# Patient Record
Sex: FEMALE | Race: BLACK OR AFRICAN AMERICAN | NOT HISPANIC OR LATINO | Employment: UNEMPLOYED | URBAN - METROPOLITAN AREA
[De-identification: names, ages, dates, MRNs, and addresses within clinical notes are randomized per-mention and may not be internally consistent; named-entity substitution may affect disease eponyms.]

---
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
## 2019-06-04 VITALS
WEIGHT: 112 LBS | BODY MASS INDEX: 17.58 KG/M2 | BODY MASS INDEX: 18.79 KG/M2 | WEIGHT: 120 LBS | WEIGHT: 120 LBS | HEIGHT: 67 IN | DIASTOLIC BLOOD PRESSURE: 74 MMHG | SYSTOLIC BLOOD PRESSURE: 111 MMHG | HEART RATE: 61 BPM | RESPIRATION RATE: 20 BRPM | DIASTOLIC BLOOD PRESSURE: 59 MMHG | BODY MASS INDEX: 18.83 KG/M2 | HEIGHT: 67 IN | HEART RATE: 81 BPM | DIASTOLIC BLOOD PRESSURE: 67 MMHG | SYSTOLIC BLOOD PRESSURE: 96 MMHG | HEART RATE: 62 BPM | SYSTOLIC BLOOD PRESSURE: 116 MMHG

## 2019-10-14 ENCOUNTER — HOSPITAL ENCOUNTER (EMERGENCY)
Facility: HOSPITAL | Age: 19
Discharge: LEFT BEFORE DISCHARGE | End: 2019-10-15

## 2019-10-14 VITALS
HEART RATE: 69 BPM | RESPIRATION RATE: 14 BRPM | TEMPERATURE: 97.7 F | OXYGEN SATURATION: 100 % | HEIGHT: 67 IN | WEIGHT: 125.88 LBS | DIASTOLIC BLOOD PRESSURE: 78 MMHG | SYSTOLIC BLOOD PRESSURE: 132 MMHG | BODY MASS INDEX: 19.76 KG/M2

## 2019-10-17 ENCOUNTER — HOSPITAL ENCOUNTER (EMERGENCY)
Facility: HOSPITAL | Age: 19
Discharge: HOME | End: 2019-10-17
Attending: EMERGENCY MEDICINE | Admitting: EMERGENCY MEDICINE

## 2019-10-17 VITALS
WEIGHT: 120.37 LBS | HEART RATE: 67 BPM | DIASTOLIC BLOOD PRESSURE: 71 MMHG | TEMPERATURE: 98.6 F | RESPIRATION RATE: 18 BRPM | BODY MASS INDEX: 18.89 KG/M2 | HEIGHT: 67 IN | OXYGEN SATURATION: 99 % | SYSTOLIC BLOOD PRESSURE: 115 MMHG

## 2019-10-17 DIAGNOSIS — R10.9 ABDOMINAL CRAMPING: ICD-10-CM

## 2019-10-17 DIAGNOSIS — Z34.90 PREGNANCY, UNSPECIFIED GESTATIONAL AGE: Primary | ICD-10-CM

## 2019-10-17 DIAGNOSIS — R11.0 NAUSEA: ICD-10-CM

## 2019-10-17 LAB
BACTERIA UR QL AUTO: ABNORMAL /HPF
BASOPHILS # BLD AUTO: 0 10*3/MM3 (ref 0–0.2)
BASOPHILS NFR BLD AUTO: 0.3 % (ref 0–1.5)
BILIRUB UR QL STRIP: NEGATIVE
C TRACH RRNA CVX QL NAA+PROBE: DETECTED
CLARITY UR: ABNORMAL
CLUE CELLS SPEC QL WET PREP: ABNORMAL
COLOR UR: YELLOW
DEPRECATED RDW RBC AUTO: 47.3 FL (ref 37–54)
EOSINOPHIL # BLD AUTO: 0 10*3/MM3 (ref 0–0.4)
EOSINOPHIL NFR BLD AUTO: 0.1 % (ref 0.3–6.2)
ERYTHROCYTE [DISTWIDTH] IN BLOOD BY AUTOMATED COUNT: 14.6 % (ref 12.3–15.4)
GLUCOSE UR STRIP-MCNC: NEGATIVE MG/DL
HCG INTACT+B SERPL-ACNC: NORMAL MIU/ML
HCT VFR BLD AUTO: 40.7 % (ref 34–46.6)
HGB BLD-MCNC: 13.8 G/DL (ref 12–15.9)
HGB UR QL STRIP.AUTO: NEGATIVE
HYALINE CASTS UR QL AUTO: ABNORMAL /LPF
HYDATID CYST SPEC WET PREP: ABNORMAL
KETONES UR QL STRIP: ABNORMAL
LEUKOCYTE ESTERASE UR QL STRIP.AUTO: ABNORMAL
LYMPHOCYTES # BLD AUTO: 1.8 10*3/MM3 (ref 0.7–3.1)
LYMPHOCYTES NFR BLD AUTO: 16.8 % (ref 19.6–45.3)
MCH RBC QN AUTO: 31.6 PG (ref 26.6–33)
MCHC RBC AUTO-ENTMCNC: 33.9 G/DL (ref 31.5–35.7)
MCV RBC AUTO: 93.3 FL (ref 79–97)
MONOCYTES # BLD AUTO: 0.6 10*3/MM3 (ref 0.1–0.9)
MONOCYTES NFR BLD AUTO: 5.2 % (ref 5–12)
N GONORRHOEA RRNA SPEC QL NAA+PROBE: NOT DETECTED
NEUTROPHILS # BLD AUTO: 8.4 10*3/MM3 (ref 1.7–7)
NEUTROPHILS NFR BLD AUTO: 77.6 % (ref 42.7–76)
NITRITE UR QL STRIP: NEGATIVE
NRBC BLD AUTO-RTO: 0 /100 WBC (ref 0–0.2)
PH UR STRIP.AUTO: 6.5 [PH] (ref 5–8)
PLATELET # BLD AUTO: 206 10*3/MM3 (ref 140–450)
PMV BLD AUTO: 7.6 FL (ref 6–12)
PROT UR QL STRIP: NEGATIVE
RBC # BLD AUTO: 4.36 10*6/MM3 (ref 3.77–5.28)
RBC # UR: ABNORMAL /HPF
REF LAB TEST METHOD: ABNORMAL
SP GR UR STRIP: 1.02 (ref 1–1.03)
SQUAMOUS #/AREA URNS HPF: ABNORMAL /HPF
T VAGINALIS SPEC QL WET PREP: ABNORMAL
UROBILINOGEN UR QL STRIP: ABNORMAL
WBC NRBC COR # BLD: 10.9 10*3/MM3 (ref 3.4–10.8)
WBC SPEC QL WET PREP: ABNORMAL
WBC UR QL AUTO: ABNORMAL /HPF
YEAST GENITAL QL WET PREP: ABNORMAL

## 2019-10-17 PROCEDURE — 36415 COLL VENOUS BLD VENIPUNCTURE: CPT

## 2019-10-17 PROCEDURE — 87591 N.GONORRHOEAE DNA AMP PROB: CPT | Performed by: EMERGENCY MEDICINE

## 2019-10-17 PROCEDURE — 87210 SMEAR WET MOUNT SALINE/INK: CPT | Performed by: EMERGENCY MEDICINE

## 2019-10-17 PROCEDURE — 81001 URINALYSIS AUTO W/SCOPE: CPT | Performed by: EMERGENCY MEDICINE

## 2019-10-17 PROCEDURE — 99283 EMERGENCY DEPT VISIT LOW MDM: CPT

## 2019-10-17 PROCEDURE — 85025 COMPLETE CBC W/AUTO DIFF WBC: CPT | Performed by: EMERGENCY MEDICINE

## 2019-10-17 PROCEDURE — 84702 CHORIONIC GONADOTROPIN TEST: CPT | Performed by: EMERGENCY MEDICINE

## 2019-10-17 PROCEDURE — 87491 CHLMYD TRACH DNA AMP PROBE: CPT | Performed by: EMERGENCY MEDICINE

## 2019-10-17 RX ORDER — PNV NO.95/FERROUS FUM/FOLIC AC 28MG-0.8MG
1 TABLET ORAL DAILY
Qty: 30 TABLET | Refills: 0 | Status: SHIPPED | OUTPATIENT
Start: 2019-10-17

## 2019-10-17 NOTE — ED PROVIDER NOTES
Subjective   History of Present Illness  19-year-old female presents with abdominal cramping.  She states this is been going on for about 3 weeks since she found out she was pregnant.  She had some nausea no vomiting.  She reports no vaginal discharge or bleeding.  She reports no dysuria.  This is her first pregnancy  Review of Systems  Negative for fever cough shortness of breath dysuria  History reviewed. No pertinent past medical history.    No Known Allergies    History reviewed. No pertinent surgical history.    History reviewed. No pertinent family history.    Social History     Socioeconomic History   • Marital status: Single     Spouse name: Not on file   • Number of children: Not on file   • Years of education: Not on file   • Highest education level: Not on file   Tobacco Use   • Smoking status: Current Every Day Smoker     Packs/day: 0.50   Substance and Sexual Activity   • Alcohol use: No     Frequency: Never   • Drug use: Yes     Frequency: 4.0 times per week     Types: Marijuana     Routine medications      Objective   Physical Exam  19-year-old female awake alert.  Generally well developed well-nourished.  Pupils equal react light.  Oropharynx mucous memories moist neck supple chest clear equal breath sounds cardiovascular regular rhythm abdomen soft without localizing tenderness mass rebound or guarding no CVA tenderness.  Pelvic exam was performed white discharge in vault noted.  Cervix is closed.  There is no cervical motion tenderness no adnexal mass or tenderness.  Uterus feels 6 to 8 weeks size  Procedures           ED Course      Results for orders placed or performed during the hospital encounter of 10/17/19   Wet Prep, Genital - Swab, Vagina   Result Value Ref Range    YEAST No yeast seen No yeast seen    HYPHAL ELEMENTS No Hyphal elements seen No Hyphal elements seen    WBC'S 2+ WBC's seen (A) No WBC's seen    Clue Cells, Wet Prep No Clue cells seen No Clue cells seen    Trichomonas, Wet  Prep No Trichomonas seen No Trichomonas seen   Urinalysis With Microscopic If Indicated (No Culture) -   Result Value Ref Range    Color, UA Yellow Yellow, Straw    Appearance, UA Cloudy (A) Clear    pH, UA 6.5 5.0 - 8.0    Specific Gravity, UA 1.025 1.005 - 1.030    Glucose, UA Negative Negative    Ketones, UA 15 mg/dL (1+) (A) Negative    Bilirubin, UA Negative Negative    Blood, UA Negative Negative    Protein, UA Negative Negative    Leuk Esterase, UA Small (1+) (A) Negative    Nitrite, UA Negative Negative    Urobilinogen, UA 1.0 E.U./dL 0.2 - 1.0 E.U./dL   hCG, Quantitative, Pregnancy   Result Value Ref Range    HCG Quantitative 80,451.00 mIU/mL   CBC Auto Differential   Result Value Ref Range    WBC 10.90 (H) 3.40 - 10.80 10*3/mm3    RBC 4.36 3.77 - 5.28 10*6/mm3    Hemoglobin 13.8 12.0 - 15.9 g/dL    Hematocrit 40.7 34.0 - 46.6 %    MCV 93.3 79.0 - 97.0 fL    MCH 31.6 26.6 - 33.0 pg    MCHC 33.9 31.5 - 35.7 g/dL    RDW 14.6 12.3 - 15.4 %    RDW-SD 47.3 37.0 - 54.0 fl    MPV 7.6 6.0 - 12.0 fL    Platelets 206 140 - 450 10*3/mm3    Neutrophil % 77.6 (H) 42.7 - 76.0 %    Lymphocyte % 16.8 (L) 19.6 - 45.3 %    Monocyte % 5.2 5.0 - 12.0 %    Eosinophil % 0.1 (L) 0.3 - 6.2 %    Basophil % 0.3 0.0 - 1.5 %    Neutrophils, Absolute 8.40 (H) 1.70 - 7.00 10*3/mm3    Lymphocytes, Absolute 1.80 0.70 - 3.10 10*3/mm3    Monocytes, Absolute 0.60 0.10 - 0.90 10*3/mm3    Eosinophils, Absolute 0.00 0.00 - 0.40 10*3/mm3    Basophils, Absolute 0.00 0.00 - 0.20 10*3/mm3    nRBC 0.0 0.0 - 0.2 /100 WBC   Urinalysis, Microscopic Only - Urine, Clean Catch   Result Value Ref Range    RBC, UA None Seen None Seen /HPF    WBC, UA 6-12 (A) None Seen /HPF    Bacteria, UA Trace (A) None Seen /HPF    Squamous Epithelial Cells, UA 3-6 (A) None Seen, 0-2 /HPF    Hyaline Casts, UA 0-2 None Seen /LPF    Methodology Manual Light Microscopy      No radiology results for the last day  Medications - No data to display  /78 (BP Location: Left  "arm, Patient Position: Lying)   Pulse 66   Temp 97.5 °F (36.4 °C) (Oral)   Resp 16   Ht 170.2 cm (67\")   Wt 54.6 kg (120 lb 5.9 oz)   LMP 09/01/2019   SpO2 99%   BMI 18.85 kg/m²               MDM  Chart review:   Comorbidity: Pregnant  Differential: Pregnancy, IUP, ectopic, UTI, vaginitis  My EKG interpretation:   Lab: Wet prep 2+ white cells no yeast no clue cells no trichomonas, urinalysis 6-12 white cells trace bacteria on voided specimen.  CBC white count 10.9 with hemoglobin 13.8 platelet count 206 with 77 segs no bands serum hCG 80,451  Radiology:   Discussion/treatment: Patient's findings were discussed with her.  She has a completely benign abdominal exam no findings that would suggest ectopic pregnancy with hCG of 80,000.  She has follow-up appointment with OB for next week.  She was discharged.  Advised she could use Tylenol for pain.  She was started on prenatal vitamins.  She is to return new or worsening symptoms increased pain or bleeding.    Final diagnoses:   Pregnancy, unspecified gestational age   Abdominal cramping   Nausea              Jaime Otero MD  10/17/19 2052    "

## 2019-10-17 NOTE — ED NOTES
Pt c/o lower abdominal pain for 2 weeks, cramping in nature. Pt approx 6 weeks pregnant. Denies vaginal bleeding, urinary symptoms, N/V/D, or fever. Pt is Ab 0.     Denise Park RN  10/17/19 0834

## 2019-10-18 NOTE — PROGRESS NOTES
Spoke to the patient and called in azithromycin 1 gm PO to [facility]. Educated the patient on importance of notifying her sexual partners to get tested and treated. Patient agreed to proceed with the treatment plan.     Contains abnormal data Chlamydia trachomatis, Neisseria gonorrhoeae, PCR - Swab, Cervix   Order: [id number]   Status:  Final result   Visible to patient:  No (Not Released) Next appt:  None   Specimen Information: Cervix; Swab        Component  Ref Range & Units    Chlamydia DNA by PCR  Not Detected Detected Abnormal     Neisseria gonorrhoeae by PCR  Not Detected Not Detected    Resulting Agency [facility]         Specimen Collected: 10/17/19 19:18 Last Resulted: 10/17/19 21:10 Order Details View Encounter Lab and Collection Details Routing Result History             Kylie Buckley, RubenD, BCPS  10/18/19 2:54 PM

## 2020-01-30 ENCOUNTER — HOSPITAL ENCOUNTER (EMERGENCY)
Facility: HOSPITAL | Age: 20
Discharge: HOME | End: 2020-01-30
Admitting: EMERGENCY MEDICINE

## 2020-01-30 VITALS
WEIGHT: 138.23 LBS | RESPIRATION RATE: 16 BRPM | HEIGHT: 67 IN | DIASTOLIC BLOOD PRESSURE: 65 MMHG | HEART RATE: 65 BPM | TEMPERATURE: 98.2 F | SYSTOLIC BLOOD PRESSURE: 99 MMHG | OXYGEN SATURATION: 98 % | BODY MASS INDEX: 21.7 KG/M2

## 2020-01-30 DIAGNOSIS — S90.415A TOE ABRASION, LEFT, INITIAL ENCOUNTER: Primary | ICD-10-CM

## 2020-01-30 PROCEDURE — 90471 IMMUNIZATION ADMIN: CPT | Performed by: PHYSICIAN ASSISTANT

## 2020-01-30 PROCEDURE — 90715 TDAP VACCINE 7 YRS/> IM: CPT | Performed by: PHYSICIAN ASSISTANT

## 2020-01-30 PROCEDURE — 25010000002 TDAP 5-2.5-18.5 LF-MCG/0.5 SUSPENSION: Performed by: PHYSICIAN ASSISTANT

## 2020-01-30 PROCEDURE — 99283 EMERGENCY DEPT VISIT LOW MDM: CPT

## 2020-01-30 RX ORDER — CEPHALEXIN 500 MG/1
500 CAPSULE ORAL 3 TIMES DAILY
Qty: 30 CAPSULE | Refills: 0 | Status: SHIPPED | OUTPATIENT
Start: 2020-01-30 | End: 2022-09-18

## 2020-01-30 RX ADMIN — TETANUS TOXOID, REDUCED DIPHTHERIA TOXOID AND ACELLULAR PERTUSSIS VACCINE, ADSORBED 0.5 ML: 5; 2.5; 8; 8; 2.5 SUSPENSION INTRAMUSCULAR at 19:34

## 2024-05-21 PROCEDURE — 99283 EMERGENCY DEPT VISIT LOW MDM: CPT

## 2024-05-22 ENCOUNTER — APPOINTMENT (OUTPATIENT)
Dept: GENERAL RADIOLOGY | Facility: HOSPITAL | Age: 24
End: 2024-05-22
Payer: MEDICAID

## 2024-05-22 ENCOUNTER — HOSPITAL ENCOUNTER (EMERGENCY)
Facility: HOSPITAL | Age: 24
Discharge: HOME | End: 2024-05-22
Attending: EMERGENCY MEDICINE | Admitting: EMERGENCY MEDICINE
Payer: MEDICAID

## 2024-05-22 VITALS
RESPIRATION RATE: 18 BRPM | OXYGEN SATURATION: 98 % | BODY MASS INDEX: 19.58 KG/M2 | WEIGHT: 124.78 LBS | HEIGHT: 67 IN | DIASTOLIC BLOOD PRESSURE: 86 MMHG | TEMPERATURE: 98.2 F | HEART RATE: 104 BPM | SYSTOLIC BLOOD PRESSURE: 132 MMHG

## 2024-05-22 DIAGNOSIS — R13.10 PAINFUL SWALLOWING: Primary | ICD-10-CM

## 2024-05-22 PROCEDURE — 70360 X-RAY EXAM OF NECK: CPT

## 2024-05-22 PROCEDURE — 71046 X-RAY EXAM CHEST 2 VIEWS: CPT

## 2024-05-22 RX ORDER — ALUMINA, MAGNESIA, AND SIMETHICONE 2400; 2400; 240 MG/30ML; MG/30ML; MG/30ML
15 SUSPENSION ORAL EVERY 6 HOURS PRN
Status: DISCONTINUED | OUTPATIENT
Start: 2024-05-22 | End: 2024-05-22 | Stop reason: HOSPADM

## 2024-05-22 RX ADMIN — ALUMINUM HYDROXIDE, MAGNESIUM HYDROXIDE, AND SIMETHICONE 15 ML: 2400; 240; 2400 SUSPENSION ORAL at 02:01

## 2024-05-22 NOTE — ED PROVIDER NOTES
"Subjective   Chief Complaint   Patient presents with    Swallowed Foreign Body     Pt reports she feels like she has something stuck in her throat.  Pt reports she ate grilled food yesterday, states she feels something on the L side of her throat.  Pt reports it is affecting her breathing.      Provider, No Known      History of Present Illness  Patient is a 24-year-old presents emergency department for evaluation of difficulty swallowing.  Patient for she feels as though something is stuck in her throat.  She Prote[employer].  Yesterday, and father may have been a \"wire\" in her food.  She denies any history of esophageal issues, no history of dilatation.  She reports she is able to drink and eat food.  No fevers.  No respiratory symptoms.  No vomiting.  No abdominal pain.  Review of Systems  Per HPI  No past medical history on file.    No Known Allergies    No past surgical history on file.    No family history on file.    Social History     Socioeconomic History    Marital status: Single   Tobacco Use    Smoking status: Every Day     Current packs/day: 0.50     Types: Cigarettes    Smokeless tobacco: Never   Substance and Sexual Activity    Alcohol use: No    Drug use: Yes     Frequency: 4.0 times per week     Types: Marijuana           Objective   Physical Exam  Vitals and nursing note reviewed.   Constitutional:       Appearance: Normal appearance. She is not ill-appearing or toxic-appearing.   HENT:      Head: Normocephalic and atraumatic.      Nose: Nose normal.      Mouth/Throat:      Lips: Pink.      Mouth: Mucous membranes are moist.      Pharynx: Oropharynx is clear. Uvula midline.   Eyes:      Conjunctiva/sclera: Conjunctivae normal.      Pupils: Pupils are equal, round, and reactive to light.   Neck:      Thyroid: No thyroid mass.      Trachea: Trachea and phonation normal.   Cardiovascular:      Rate and Rhythm: Normal rate and regular rhythm.      Heart sounds: Normal heart sounds. No murmur heard.     No " "friction rub. No gallop.   Pulmonary:      Effort: Pulmonary effort is normal.      Breath sounds: Normal breath sounds. No stridor.   Abdominal:      General: Bowel sounds are normal.      Palpations: Abdomen is soft.      Tenderness: There is no abdominal tenderness. There is no guarding or rebound.   Musculoskeletal:         General: Normal range of motion.      Cervical back: Full passive range of motion without pain, normal range of motion and neck supple.      Right lower leg: No edema.      Left lower leg: No edema.   Lymphadenopathy:      Cervical: No cervical adenopathy.   Skin:     General: Skin is warm and dry.      Capillary Refill: Capillary refill takes less than 2 seconds.      Findings: No rash.   Neurological:      General: No focal deficit present.      Mental Status: She is alert and oriented to person, place, and time.   Psychiatric:         Mood and Affect: Mood normal.         Behavior: Behavior normal.         Procedures           ED Course      /86 (BP Location: Left arm, Patient Position: Sitting)   Pulse 104   Temp 98.2 °F (36.8 °C) (Oral)   Resp 18   Ht 170.2 cm (67\")   Wt 56.6 kg (124 lb 12.5 oz)   LMP 05/17/2024   SpO2 98%   Breastfeeding No   BMI 19.54 kg/m²   Medications - No data to display  XR Neck Soft Tissue    Result Date: 5/22/2024  Impression: No evidence of foreign body on this single lateral image of the neck. Electronically Signed: Jacob Bui MD  5/22/2024 1:55 AM EDT  Workstation ID: [id number]    XR Chest 2 View    Result Date: 5/22/2024  Impression: No active disease Electronically Signed: Jacob Bui MD  5/22/2024 1:54 AM EDT  Workstation ID: [id number]   Lab Results (last 24 hours)       ** No results found for the last 24 hours. **                                                 Medical Decision Making  Problems Addressed:  Painful swallowing: complicated acute illness or injury    Amount and/or Complexity of Data Reviewed  Radiology: ordered.    Risk  OTC " drugs.      Imaging: XR Neck Soft Tissue    Result Date: 5/22/2024  Impression: No evidence of foreign body on this single lateral image of the neck. Electronically Signed: Jacob Bui MD  5/22/2024 1:55 AM EDT  Workstation ID: [id number]    XR Chest 2 View    Result Date: 5/22/2024  Impression: No active disease Electronically Signed: Jacob Bui MD  5/22/2024 1:54 AM EDT  Workstation ID: [id number]     Pt was Placed on appropriate monitoring.  Differential diagnoses considered for patient presentation, this list is not all inclusive of diagnoses considered: Food bolus, foreign body  Patient presents to the ED for the above complaint, underwent the above, exam and workup.  Patient is tolerating her own secretions.  She is able to drink without vomiting.  X-ray imaging is negative.  She is nontoxic non-ill-appearing.  Appears to be discharged home and continue outpatient follow-up  Disposition: I discussed my findings, plan of care, discharge instructions, the importance of follow up with their PCP/ and or specialist for repeat evaluation and to discuss any abnormal findings in labs or imaging that warrant further outpatient evaluation. We discussed that although a definitive diagnosis is not always found in the ED, it is believed emergent conditions have been ruled out, and patient is safe for discharge at this time.  We discussed return precautions for the emergency department.  Patient verbalizes understandings, and agrees with current plan of care.  Note Disclaimer: At [facility], we believe that sharing information builds trust and better relationships. You are receiving this note because you recently visited [facility]. It is possible you will see health information before a provider has talked with you about it. This kind of information can be easy to misunderstand. To help you fully understand what it means for your health, we urge you to discuss this note with your provider  Note dictated utilizing  [employer] Dict[employer].  Appropriate PPE worn during patient interactions.        Final diagnoses:   Painful swallowing       ED Disposition  ED Disposition       ED Disposition   Discharge    Condition   Stable    Comment   --               [facility] EMERGENCY DEPARTMENT  [address] [address] [phone]        [facility]STROENTEROLOGY [facility]  [address] [address] [phone]  Schedule an appointment as soon as possible for a visit       [facility]  [address] [facility] [address] [phone]             Medication List      No changes were made to your prescriptions during this visit.            Juany Bales, APRN  05/22/24 0446

## 2024-05-22 NOTE — DISCHARGE INSTRUCTIONS
Follow-up with ENT and gastroenterology, call for appointment  Return to ED for worsening symptoms

## 2025-05-29 ENCOUNTER — APPOINTMENT (OUTPATIENT)
Dept: CT IMAGING | Facility: HOSPITAL | Age: 25
End: 2025-05-29
Payer: MEDICAID

## 2025-05-29 ENCOUNTER — HOSPITAL ENCOUNTER (EMERGENCY)
Facility: HOSPITAL | Age: 25
Discharge: HOME | End: 2025-05-30
Attending: EMERGENCY MEDICINE
Payer: MEDICAID

## 2025-05-29 ENCOUNTER — APPOINTMENT (OUTPATIENT)
Dept: ULTRASOUND IMAGING | Facility: HOSPITAL | Age: 25
End: 2025-05-29
Payer: MEDICAID

## 2025-05-29 DIAGNOSIS — D72.829 LEUKOCYTOSIS, UNSPECIFIED TYPE: ICD-10-CM

## 2025-05-29 DIAGNOSIS — R05.1 ACUTE COUGH: ICD-10-CM

## 2025-05-29 DIAGNOSIS — B34.8 RHINOVIRUS: Primary | ICD-10-CM

## 2025-05-29 LAB
ABO GROUP BLD: NORMAL
ALBUMIN SERPL-MCNC: 4.7 G/DL (ref 3.5–5.2)
ALBUMIN/GLOB SERPL: 1.3 G/DL
ALP SERPL-CCNC: 84 U/L (ref 39–117)
ALT SERPL W P-5'-P-CCNC: 14 U/L (ref 1–33)
ANION GAP SERPL CALCULATED.3IONS-SCNC: 14.1 MMOL/L (ref 5–15)
APTT PPP: 29 SECONDS (ref 22.7–35.4)
AST SERPL-CCNC: 32 U/L (ref 1–32)
B PARAPERT DNA SPEC QL NAA+PROBE: NOT DETECTED
B PERT DNA SPEC QL NAA+PROBE: NOT DETECTED
BASOPHILS # BLD AUTO: 0.06 10*3/MM3 (ref 0–0.2)
BASOPHILS NFR BLD AUTO: 0.4 % (ref 0–1.5)
BILIRUB SERPL-MCNC: 1.3 MG/DL (ref 0–1.2)
BILIRUB UR QL STRIP: NEGATIVE
BUN SERPL-MCNC: 6.6 MG/DL (ref 6–20)
BUN/CREAT SERPL: 7.3 (ref 7–25)
C PNEUM DNA NPH QL NAA+NON-PROBE: NOT DETECTED
C TRACH DNA SPEC QL NAA+PROBE: NOT DETECTED
CALCIUM SPEC-SCNC: 9.9 MG/DL (ref 8.6–10.5)
CHLORIDE SERPL-SCNC: 98 MMOL/L (ref 98–107)
CLARITY UR: CLEAR
CLUE CELLS SPEC QL WET PREP: ABNORMAL
CO2 SERPL-SCNC: 20.9 MMOL/L (ref 22–29)
COLOR UR: YELLOW
CREAT SERPL-MCNC: 0.9 MG/DL (ref 0.57–1)
D DIMER PPP FEU-MCNC: 1.54 MCGFEU/ML (ref 0–0.5)
DEPRECATED RDW RBC AUTO: 47.8 FL (ref 37–54)
EGFRCR SERPLBLD CKD-EPI 2021: 90.6 ML/MIN/1.73
EOSINOPHIL # BLD AUTO: 0.17 10*3/MM3 (ref 0–0.4)
EOSINOPHIL NFR BLD AUTO: 1 % (ref 0.3–6.2)
ERYTHROCYTE [DISTWIDTH] IN BLOOD BY AUTOMATED COUNT: 14.8 % (ref 12.3–15.4)
FLUAV SUBTYP SPEC NAA+PROBE: NOT DETECTED
FLUBV RNA ISLT QL NAA+PROBE: NOT DETECTED
GLOBULIN UR ELPH-MCNC: 3.5 GM/DL
GLUCOSE SERPL-MCNC: 107 MG/DL (ref 65–99)
GLUCOSE UR STRIP-MCNC: NEGATIVE MG/DL
HADV DNA SPEC NAA+PROBE: NOT DETECTED
HCG INTACT+B SERPL-ACNC: 7022 MIU/ML
HCOV 229E RNA SPEC QL NAA+PROBE: NOT DETECTED
HCOV HKU1 RNA SPEC QL NAA+PROBE: NOT DETECTED
HCOV NL63 RNA SPEC QL NAA+PROBE: NOT DETECTED
HCOV OC43 RNA SPEC QL NAA+PROBE: NOT DETECTED
HCT VFR BLD AUTO: 43.2 % (ref 34–46.6)
HGB BLD-MCNC: 14.6 G/DL (ref 12–15.9)
HGB UR QL STRIP.AUTO: NEGATIVE
HMPV RNA NPH QL NAA+NON-PROBE: NOT DETECTED
HPIV1 RNA ISLT QL NAA+PROBE: NOT DETECTED
HPIV2 RNA SPEC QL NAA+PROBE: NOT DETECTED
HPIV3 RNA NPH QL NAA+PROBE: NOT DETECTED
HPIV4 P GENE NPH QL NAA+PROBE: NOT DETECTED
HYDATID CYST SPEC WET PREP: ABNORMAL
IMM GRANULOCYTES # BLD AUTO: 0.07 10*3/MM3 (ref 0–0.05)
IMM GRANULOCYTES NFR BLD AUTO: 0.4 % (ref 0–0.5)
INR PPP: 1.12 (ref 0.9–1.1)
KETONES UR QL STRIP: ABNORMAL
LEUKOCYTE ESTERASE UR QL STRIP.AUTO: NEGATIVE
LYMPHOCYTES # BLD AUTO: 2.09 10*3/MM3 (ref 0.7–3.1)
LYMPHOCYTES NFR BLD AUTO: 12.6 % (ref 19.6–45.3)
M PNEUMO IGG SER IA-ACNC: NOT DETECTED
MCH RBC QN AUTO: 29.4 PG (ref 26.6–33)
MCHC RBC AUTO-ENTMCNC: 33.8 G/DL (ref 31.5–35.7)
MCV RBC AUTO: 87.1 FL (ref 79–97)
MONOCYTES # BLD AUTO: 0.75 10*3/MM3 (ref 0.1–0.9)
MONOCYTES NFR BLD AUTO: 4.5 % (ref 5–12)
N GONORRHOEA RRNA SPEC QL NAA+PROBE: NOT DETECTED
NEUTROPHILS NFR BLD AUTO: 13.42 10*3/MM3 (ref 1.7–7)
NEUTROPHILS NFR BLD AUTO: 81.1 % (ref 42.7–76)
NITRITE UR QL STRIP: NEGATIVE
NRBC BLD AUTO-RTO: 0 /100 WBC (ref 0–0.2)
NT-PROBNP SERPL-MCNC: <36 PG/ML (ref 0–450)
NUMBER OF DOSES: NORMAL
PH UR STRIP.AUTO: 6.5 [PH] (ref 5–8)
PLATELET # BLD AUTO: 286 10*3/MM3 (ref 140–450)
PMV BLD AUTO: 9.9 FL (ref 6–12)
POTASSIUM SERPL-SCNC: 3.8 MMOL/L (ref 3.5–5.2)
PROT SERPL-MCNC: 8.2 G/DL (ref 6–8.5)
PROT UR QL STRIP: NEGATIVE
PROTHROMBIN TIME: 14.4 SECONDS (ref 11.7–14.2)
RBC # BLD AUTO: 4.96 10*6/MM3 (ref 3.77–5.28)
RH BLD: POSITIVE
RHINOVIRUS RNA SPEC NAA+PROBE: DETECTED
RSV RNA NPH QL NAA+NON-PROBE: NOT DETECTED
SARS-COV-2 RNA RESP QL NAA+PROBE: NOT DETECTED
SODIUM SERPL-SCNC: 133 MMOL/L (ref 136–145)
SP GR UR STRIP: 1.05 (ref 1–1.03)
T VAGINALIS SPEC QL WET PREP: ABNORMAL
UROBILINOGEN UR QL STRIP: ABNORMAL
WBC NRBC COR # BLD AUTO: 16.56 10*3/MM3 (ref 3.4–10.8)
WBC SPEC QL WET PREP: ABNORMAL
YEAST GENITAL QL WET PREP: ABNORMAL

## 2025-05-29 PROCEDURE — 80053 COMPREHEN METABOLIC PANEL: CPT | Performed by: EMERGENCY MEDICINE

## 2025-05-29 PROCEDURE — 86900 BLOOD TYPING SEROLOGIC ABO: CPT | Performed by: EMERGENCY MEDICINE

## 2025-05-29 PROCEDURE — 0202U NFCT DS 22 TRGT SARS-COV-2: CPT | Performed by: EMERGENCY MEDICINE

## 2025-05-29 PROCEDURE — 25510000001 IOPAMIDOL PER 1 ML: Performed by: EMERGENCY MEDICINE

## 2025-05-29 PROCEDURE — 87210 SMEAR WET MOUNT SALINE/INK: CPT | Performed by: EMERGENCY MEDICINE

## 2025-05-29 PROCEDURE — 76801 OB US < 14 WKS SINGLE FETUS: CPT

## 2025-05-29 PROCEDURE — 85025 COMPLETE CBC W/AUTO DIFF WBC: CPT | Performed by: EMERGENCY MEDICINE

## 2025-05-29 PROCEDURE — 76817 TRANSVAGINAL US OBSTETRIC: CPT

## 2025-05-29 PROCEDURE — 87591 N.GONORRHOEAE DNA AMP PROB: CPT | Performed by: EMERGENCY MEDICINE

## 2025-05-29 PROCEDURE — 83880 ASSAY OF NATRIURETIC PEPTIDE: CPT | Performed by: EMERGENCY MEDICINE

## 2025-05-29 PROCEDURE — 99285 EMERGENCY DEPT VISIT HI MDM: CPT

## 2025-05-29 PROCEDURE — 93976 VASCULAR STUDY: CPT

## 2025-05-29 PROCEDURE — 85730 THROMBOPLASTIN TIME PARTIAL: CPT | Performed by: EMERGENCY MEDICINE

## 2025-05-29 PROCEDURE — 85610 PROTHROMBIN TIME: CPT | Performed by: EMERGENCY MEDICINE

## 2025-05-29 PROCEDURE — 87491 CHLMYD TRACH DNA AMP PROBE: CPT | Performed by: EMERGENCY MEDICINE

## 2025-05-29 PROCEDURE — 81003 URINALYSIS AUTO W/O SCOPE: CPT | Performed by: EMERGENCY MEDICINE

## 2025-05-29 PROCEDURE — 93005 ELECTROCARDIOGRAM TRACING: CPT | Performed by: EMERGENCY MEDICINE

## 2025-05-29 PROCEDURE — 86901 BLOOD TYPING SEROLOGIC RH(D): CPT | Performed by: EMERGENCY MEDICINE

## 2025-05-29 PROCEDURE — 85379 FIBRIN DEGRADATION QUANT: CPT | Performed by: EMERGENCY MEDICINE

## 2025-05-29 PROCEDURE — 84702 CHORIONIC GONADOTROPIN TEST: CPT | Performed by: EMERGENCY MEDICINE

## 2025-05-29 PROCEDURE — 71275 CT ANGIOGRAPHY CHEST: CPT

## 2025-05-29 RX ORDER — IOPAMIDOL 755 MG/ML
100 INJECTION, SOLUTION INTRAVASCULAR
Status: COMPLETED | OUTPATIENT
Start: 2025-05-29 | End: 2025-05-29

## 2025-05-29 RX ORDER — SODIUM CHLORIDE 0.9 % (FLUSH) 0.9 %
10 SYRINGE (ML) INJECTION AS NEEDED
Status: DISCONTINUED | OUTPATIENT
Start: 2025-05-29 | End: 2025-05-30 | Stop reason: HOSPADM

## 2025-05-29 RX ADMIN — IOPAMIDOL 100 ML: 755 INJECTION, SOLUTION INTRAVENOUS at 21:20

## 2025-05-29 NOTE — ED PROVIDER NOTES
"Subjective   History of Present Illness  Chief complaint: Patient is a 25-year-old who states she is 5 weeks 3 days pregnant.  She has the June 24 OB/GYN appointment.  She states that she is G3, P2.  She states that she is here for cough congestion.  She has pain in her chest when she coughs.  She states \"it feels tight\".  She has had congestion.  She does not have hemoptysis.  It does hurt worse when she breathes in.  She is also having some left lower quadrant cramping intermittently.  There is discoloration \"when I wipe\".  She has not had fever.  She has felt warm however and thinks she may have had a subjective fever.  She states her respiratory symptoms have been over the last couple days.    Context:    Duration:    Timing:    Severity:    Associated Symptoms:        PCP:  LMP:        Review of Systems   HENT:  Positive for congestion.    Respiratory:  Positive for cough and chest tightness.    Cardiovascular:  Positive for chest pain.   Gastrointestinal: Negative.    Genitourinary:  Positive for vaginal discharge.   Musculoskeletal: Negative.    Neurological: Negative.        History reviewed. No pertinent past medical history.    No Known Allergies    History reviewed. No pertinent surgical history.    History reviewed. No pertinent family history.    Social History     Socioeconomic History    Marital status: Single   Tobacco Use    Smoking status: Every Day     Current packs/day: 0.50     Types: Cigarettes    Smokeless tobacco: Never   Substance and Sexual Activity    Alcohol use: No    Drug use: Yes     Frequency: 4.0 times per week     Types: Marijuana           Objective   Physical Exam  Vitals and nursing note reviewed.   Constitutional:       Appearance: Normal appearance.   HENT:      Head: Normocephalic and atraumatic.   Cardiovascular:      Rate and Rhythm: Normal rate.      Heart sounds: Normal heart sounds.   Pulmonary:      Effort: Pulmonary effort is normal.      Breath sounds: Normal breath " sounds.   Abdominal:      Palpations: Abdomen is soft.      Tenderness: There is no abdominal tenderness.   Musculoskeletal:         General: Normal range of motion.   Skin:     General: Skin is warm and dry.   Neurological:      Mental Status: She is alert.   Psychiatric:         Mood and Affect: Mood normal.         Thought Content: Thought content normal.         Procedures           ED Course  ED Course as of 05/30/25 0335   Thu May 29, 2025   2056 I discussed the risks and benefits of CT chest in pregnancy.  Patient verbalized understanding and agrees for CT scan [LH]   2152 Assumed care from Dr. Garcia [ME]   2310 Discussed results thus far with patient.  Pending urinalysis results. [ME]   2354 Ultrasound and urinalysis results discussed with patient.  She is resting comfortably no acute distress.  Patient is afebrile and hemodynamically stable. Return precautions were discussed with patient who verbalized agreement and understanding. [ME]      ED Course User Index  [LH] Tomas Garcia,   [ME] Ania Farnsworth PA-C                                                       Medical Decision Making  Patient was seen evaluated for above problem    Differential diagnosis includes was not limited to respiratory infection, pneumonia, myocarditis, CHF, PE, ectopic pregnancy,    Patient does have infectious symptoms.  She actually did test positive for rhinovirus here.  Her D-dimer was elevated she is pregnant.  I discussed the risks and benefits of CT chest.  She verbalized understanding and is okay with having the procedure.  CT chest shows some hazy groundglass opacities centrally.  Potentially from the viral illness and most likely from the rhinovirus infection.  Her quant is over 7000.  With these recurrent episodes of left sided pelvic pain she will be sent for ultrasound.  BNP and UA further results pending.  Patient will be dispositioned by SACHI Sandoval follow-up results and  reevaluation.    Problems Addressed:  Acute cough: complicated acute illness or injury  Leukocytosis, unspecified type: complicated acute illness or injury  Rhinovirus: complicated acute illness or injury    Amount and/or Complexity of Data Reviewed  Labs: ordered.  Radiology: ordered.  ECG/medicine tests: ordered and independent interpretation performed.     Details: EKG interpreted by myself sinus rhythm no signs of STEMI.    Risk  Prescription drug management.        Final diagnoses:   Rhinovirus   Leukocytosis, unspecified type   Acute cough   Rhinovirus infection  Pregnant      ED Disposition  ED Disposition       ED Disposition   Discharge    Condition   Stable    Comment   --               PATIENT CONNECTION - [facility] [address]  [phone]  Call       Your OB/GYN    Schedule an appointment as soon as possible for a visit            Medication List      No changes were made to your prescriptions during this visit.          22.7 - 35.4 seconds   D-dimer, Quantitative    Collection Time: 25  8:06 PM    Specimen: Arm, Left; Blood   Result Value Ref Range    D-Dimer, Quantitative 1.54 (H) 0.00 - 0.50 MCGFEU/mL   CBC Auto Differential    Collection Time: 25  8:06 PM    Specimen: Arm, Left; Blood   Result Value Ref Range    WBC 16.56 (H) 3.40 - 10.80 10*3/mm3    RBC 4.96 3.77 - 5.28 10*6/mm3    Hemoglobin 14.6 12.0 - 15.9 g/dL    Hematocrit 43.2 34.0 - 46.6 %    MCV 87.1 79.0 - 97.0 fL    MCH 29.4 26.6 - 33.0 pg    MCHC 33.8 31.5 - 35.7 g/dL    RDW 14.8 12.3 - 15.4 %    RDW-SD 47.8 37.0 - 54.0 fl    MPV 9.9 6.0 - 12.0 fL    Platelets 286 140 - 450 10*3/mm3    Neutrophil % 81.1 (H) 42.7 - 76.0 %    Lymphocyte % 12.6 (L) 19.6 - 45.3 %    Monocyte % 4.5 (L) 5.0 - 12.0 %    Eosinophil % 1.0 0.3 - 6.2 %    Basophil % 0.4 0.0 - 1.5 %    Immature Grans % 0.4 0.0 - 0.5 %    Neutrophils, Absolute 13.42 (H) 1.70 - 7.00 10*3/mm3    Lymphocytes, Absolute 2.09 0.70 - 3.10 10*3/mm3    Monocytes, Absolute 0.75 0.10 - 0.90 10*3/mm3    Eosinophils, Absolute 0.17 0.00 - 0.40 10*3/mm3    Basophils, Absolute 0.06 0.00 - 0.20 10*3/mm3    Immature Grans, Absolute 0.07 (H) 0.00 - 0.05 10*3/mm3    nRBC 0.0 0.0 - 0.2 /100 WBC   BNP    Collection Time: 25  8:06 PM    Specimen: Arm, Left; Blood   Result Value Ref Range    proBNP <36.0 0.0 - 450.0 pg/mL    RhIg Evaluation    Collection Time: 25  8:06 PM    Specimen: Arm, Left; Blood   Result Value Ref Range    ABO Type A     RH type Positive    Doses of Rh Immune Globulin    Collection Time: 25  8:06 PM    Specimen: Arm, Left; Blood   Result Value Ref Range    Number of Doses       RhIg is not indicated due to the patient's Rh status   Wet Prep, Genital - Swab, Vagina    Collection Time: 25  9:12 PM    Specimen: Vagina; Swab   Result Value Ref Range    YEAST No yeast seen No yeast seen    HYPHAL ELEMENTS No Hyphal elements seen No Hyphal elements seen    WBC'S 1+ WBC's seen  (A) No WBC's seen    Clue Cells, Wet Prep No Clue cells seen No Clue cells seen    Trichomonas, Wet Prep No Trichomonas seen No Trichomonas seen   ECG 12 Lead Chest Pain    Collection Time: 05/29/25  9:20 PM   Result Value Ref Range    QT Interval 367 ms    QTC Interval 439 ms   Urinalysis With Culture If Indicated - Urine, Clean Catch    Collection Time: 05/29/25 11:29 PM    Specimen: Urine, Clean Catch   Result Value Ref Range    Color, UA Yellow Yellow, Straw    Appearance, UA Clear Clear    pH, UA 6.5 5.0 - 8.0    Specific Gravity, UA 1.048 (H) 1.005 - 1.030    Glucose, UA Negative Negative    Ketones, UA 15 mg/dL (1+) (A) Negative    Bilirubin, UA Negative Negative    Blood, UA Negative Negative    Protein, UA Negative Negative    Leuk Esterase, UA Negative Negative    Nitrite, UA Negative Negative    Urobilinogen, UA 1.0 E.U./dL 0.2 - 1.0 E.U./dL     US Ob Transvaginal  Result Date: 5/29/2025  Impression: 1.Tiny gestational sac within uterine endometrial cavity, but no yolk sac or fetal pole identified. Ultrasound gestational age corresponds to 5 weeks 5 days, for expected date of delivery January 24, 2026. Recommend correlation with serial hCGs and repeat pelvic ultrasound as clinically warranted. 2.1.8 cm complex left adnexal cyst, likely a corpus luteum. Electronically Signed: Brown Livingston MD  5/29/2025 10:17 PM EDT  Workstation ID: [id number]    US Ob < 14 Weeks Single or First Gestation  Result Date: 5/29/2025  Impression: 1.Tiny gestational sac within uterine endometrial cavity, but no yolk sac or fetal pole identified. Ultrasound gestational age corresponds to 5 weeks 5 days, for expected date of delivery January 24, 2026. Recommend correlation with serial hCGs and repeat pelvic ultrasound as clinically warranted. 2.1.8 cm complex left adnexal cyst, likely a corpus luteum. Electronically Signed: Brown Livingston MD  5/29/2025 10:17 PM EDT  Workstation ID: [id number]    CT Angiogram Chest Pulmonary  Embolism  Result Date: 5/29/2025  Increased groundglass noted centrally within the lungs with peripheral sparing. No pulmonary embolus. Differential considerations include a viral process, pulmonary venous congestion, or much less likely developing multi lobar bilateral pneumonia. Electronically Signed: Braulio Rasheed MD  5/29/2025 9:29 PM EDT  Workstation ID: [id number]               Medical Decision Making  Patient was seen evaluated for above problem    Differential diagnosis includes was not limited to respiratory infection, pneumonia, myocarditis, CHF, PE, ectopic pregnancy,    Patient does have infectious symptoms.  She actually did test positive for rhinovirus here.  Her D-dimer was elevated she is pregnant.  I discussed the risks and benefits of CT chest.  She verbalized understanding and is okay with having the procedure.  CT chest shows some hazy groundglass opacities centrally.  Potentially from the viral illness and most likely from the rhinovirus infection.  Her quant is over 7000.  With these recurrent episodes of left sided pelvic pain she will be sent for ultrasound.  BNP and UA further results pending.  Patient will be dispositioned by SACHI Sandoval follow-up results and reevaluation.    Problems Addressed:  Acute cough: complicated acute illness or injury  Leukocytosis, unspecified type: complicated acute illness or injury  Rhinovirus: complicated acute illness or injury    Amount and/or Complexity of Data Reviewed  Labs: ordered.  Radiology: ordered.  ECG/medicine tests: ordered and independent interpretation performed.     Details: EKG interpreted by myself sinus rhythm no signs of STEMI.    Risk  Prescription drug management.        Final diagnoses:   Rhinovirus   Leukocytosis, unspecified type   Acute cough   Rhinovirus infection  Pregnant      ED Disposition  ED Disposition       ED Disposition   Discharge    Condition   Stable    Comment   --               PATIENT CONNECTION - [facility]  [facility] [address]  [phone]  Call       Your OB/GYN    Schedule an appointment as soon as possible for a visit            Medication List      No changes were made to your prescriptions during this visit.            Tomas Garcia DO  06/09/25 0810

## 2025-05-29 NOTE — Clinical Note
[facility] EMERGENCY DEPARTMENT  [address] [place] [id number]  Phone: [phone]    Bernie Gary was seen and treated in our emergency department on 5/29/2025.  She may return to work on 06/02/2025.         Thank you for choosing [facility].    Tomas Garcia,

## 2025-05-30 VITALS
TEMPERATURE: 98.7 F | SYSTOLIC BLOOD PRESSURE: 120 MMHG | OXYGEN SATURATION: 96 % | HEIGHT: 67 IN | RESPIRATION RATE: 20 BRPM | WEIGHT: 125.66 LBS | DIASTOLIC BLOOD PRESSURE: 71 MMHG | BODY MASS INDEX: 19.72 KG/M2 | HEART RATE: 89 BPM

## 2025-05-30 LAB
QT INTERVAL: 367 MS
QTC INTERVAL: 439 MS

## 2025-05-30 NOTE — DISCHARGE INSTRUCTIONS
Follow-up with primary care and your OB/GYN.    Increase fluid intake.    Use coolmist humidifier at home.    You can use cough drops according to package instructions.    Return to the ER with new or worsening symptoms.